# Patient Record
Sex: MALE | Race: BLACK OR AFRICAN AMERICAN
[De-identification: names, ages, dates, MRNs, and addresses within clinical notes are randomized per-mention and may not be internally consistent; named-entity substitution may affect disease eponyms.]

---

## 2019-05-09 PROBLEM — Z00.00 ENCOUNTER FOR PREVENTIVE HEALTH EXAMINATION: Status: ACTIVE | Noted: 2019-05-09

## 2019-05-16 ENCOUNTER — APPOINTMENT (OUTPATIENT)
Dept: PHYSICAL MEDICINE AND REHAB | Facility: CLINIC | Age: 59
End: 2019-05-16

## 2019-05-23 ENCOUNTER — APPOINTMENT (OUTPATIENT)
Dept: PHYSICAL MEDICINE AND REHAB | Facility: CLINIC | Age: 59
End: 2019-05-23
Payer: OTHER MISCELLANEOUS

## 2019-05-23 VITALS — HEIGHT: 74 IN | BODY MASS INDEX: 40.43 KG/M2 | WEIGHT: 315 LBS

## 2019-05-23 PROCEDURE — 99204 OFFICE O/P NEW MOD 45 MIN: CPT

## 2019-05-23 NOTE — PHYSICAL EXAM
[Antalgic Gait Right] : antalgic on the right [Normal Motor Tone] : the muscle tone was normal [Involuntary Movements] : no involuntary movements were seen [0] : left 0 [1+] : left 1+ [Normal] : Oriented to person, place, and time, insight and judgement were intact and the affect was normal [FreeTextEntry1] : No gross deformity, TTP of the lumbar paraspinals: bilaterally, ROM limited with all planes, SLR positive: left [de-identified] : No gross deformity, TTP of the lumbar paraspinals: bilaterally, ROM limited with all planes, SLR positive: left
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
## 2019-05-23 NOTE — ASSESSMENT
[FreeTextEntry1] : Patient had improvement in symptoms after 1st epidural for 6-7 months, the last two epidurals after which were done about 5 months ago provided no relief. I do not have an MRI of his L spine. I instructed him to get me the MRI so I can review it and find the appropriate management for him. He may need left L5/S1 ILESI.

## 2019-07-31 ENCOUNTER — APPOINTMENT (OUTPATIENT)
Dept: PHYSICAL MEDICINE AND REHAB | Facility: CLINIC | Age: 59
End: 2019-07-31

## 2019-08-07 ENCOUNTER — APPOINTMENT (OUTPATIENT)
Dept: PHYSICAL MEDICINE AND REHAB | Facility: CLINIC | Age: 59
End: 2019-08-07

## 2019-08-21 ENCOUNTER — APPOINTMENT (OUTPATIENT)
Dept: PHYSICAL MEDICINE AND REHAB | Facility: CLINIC | Age: 59
End: 2019-08-21
Payer: OTHER MISCELLANEOUS

## 2019-08-21 DIAGNOSIS — M47.817 SPONDYLOSIS W/OUT MYELOPATHY OR RADICULOPATHY, LUMBOSACRAL REGION: ICD-10-CM

## 2019-08-21 PROCEDURE — 99213 OFFICE O/P EST LOW 20 MIN: CPT

## 2019-08-21 NOTE — PHYSICAL EXAM
[Antalgic Gait Right] : antalgic on the right [Normal Motor Tone] : the muscle tone was normal [Involuntary Movements] : no involuntary movements were seen [0] : left 0 [1+] : right 1+ [Normal] : Oriented to person, place, and time, insight and judgement were intact and the affect was normal [de-identified] : No gross deformity, TTP of the lumbar paraspinals: bilaterally, ROM limited with all planes, SLR positive: left

## 2019-08-21 NOTE — ASSESSMENT
[FreeTextEntry1] : MRI reviewed, patient has two herniated discs at L4/5 and L5/S1. He is still having pain going his left leg and he has not been able to start PT as the  discontinued therapy over 6-7 months ago. He did PT for 8 months without significant improvement at that time. Based on his evaluation he may benefit from a left L5/S1 ILESI. If he continues to have low back pain after his leg pain is gone, he may benefit from RFA in the future.
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
## 2019-09-04 ENCOUNTER — APPOINTMENT (OUTPATIENT)
Dept: PHYSICAL MEDICINE AND REHAB | Facility: CLINIC | Age: 59
End: 2019-09-04

## 2019-09-05 ENCOUNTER — TRANSCRIPTION ENCOUNTER (OUTPATIENT)
Age: 59
End: 2019-09-05

## 2019-10-16 ENCOUNTER — APPOINTMENT (OUTPATIENT)
Dept: PHYSICAL MEDICINE AND REHAB | Facility: CLINIC | Age: 59
End: 2019-10-16

## 2019-12-03 ENCOUNTER — APPOINTMENT (OUTPATIENT)
Dept: PHYSICAL MEDICINE AND REHAB | Facility: CLINIC | Age: 59
End: 2019-12-03
Payer: OTHER MISCELLANEOUS

## 2019-12-03 PROCEDURE — 62323 NJX INTERLAMINAR LMBR/SAC: CPT | Mod: LT

## 2019-12-18 ENCOUNTER — APPOINTMENT (OUTPATIENT)
Dept: PHYSICAL MEDICINE AND REHAB | Facility: CLINIC | Age: 59
End: 2019-12-18

## 2020-05-20 ENCOUNTER — APPOINTMENT (OUTPATIENT)
Dept: PHYSICAL MEDICINE AND REHAB | Facility: CLINIC | Age: 60
End: 2020-05-20
Payer: OTHER MISCELLANEOUS

## 2020-05-20 PROCEDURE — 99214 OFFICE O/P EST MOD 30 MIN: CPT | Mod: 95

## 2020-05-20 RX ORDER — GABAPENTIN 300 MG/1
300 CAPSULE ORAL 3 TIMES DAILY
Qty: 90 | Refills: 2 | Status: ACTIVE | COMMUNITY
Start: 2020-05-20 | End: 1900-01-01

## 2020-05-21 NOTE — ASSESSMENT
[FreeTextEntry1] : We will begin with conservative management including physical therapy and oral Gabapentin. If patient has not improved after 2 weeks consider repeat FRAN. \par

## 2020-05-21 NOTE — PHYSICAL EXAM
[FreeTextEntry1] : General: NAD, sitting comfortably in good spirits\par Eyes: Normal sclera, EOMI\par Lymphatic: no obvious edema\par Cardiovascular: no cyanosis\par Skin: Fully clothed, no gross abnormalities\par Neurological/Psychiatric: Sensation intact,  Alert and Oriented to person, place and time\par MSK:\par Gait: Ambulating with no assistive device\par LUE: no gross abnormalities. Normal ROM.  At least antigravity\par RUE: no gross abnormalities. Normal ROM.  At least antigravity\par LLE: no gross abnormalities. Normal ROM.  At least antigravity\par RLE: no gross abnormalities. Normal ROM.  At least antigravity\par Lumbar Spine: No gross deformity, TTP of the lumbar paraspinals: bilaterally (reported by patient), ROM limited with all planes, SLR positive: left\par

## 2020-05-21 NOTE — HISTORY OF PRESENT ILLNESS
[Home] : at home, [unfilled] , at the time of the visit. [Other Location: e.g. Home (Enter Location, City,State)___] : at [unfilled] [Verbal consent obtained from patient] : the patient, [unfilled] [FreeTextEntry1] : Patient is following up on low back pain. He had left L5/S1 ILESI done on 12/3/2019 with greater than 50% improvement in pain. He was doing well overall until about 2-3 weeks ago when he began having the pain again. Pain is moderate at this time. He wants to know if another epidural will help.

## 2020-11-11 ENCOUNTER — APPOINTMENT (OUTPATIENT)
Dept: PHYSICAL MEDICINE AND REHAB | Facility: CLINIC | Age: 60
End: 2020-11-11

## 2020-11-18 ENCOUNTER — APPOINTMENT (OUTPATIENT)
Dept: PHYSICAL MEDICINE AND REHAB | Facility: CLINIC | Age: 60
End: 2020-11-18

## 2021-03-12 ENCOUNTER — APPOINTMENT (OUTPATIENT)
Dept: PHYSICAL MEDICINE AND REHAB | Facility: CLINIC | Age: 61
End: 2021-03-12
Payer: OTHER MISCELLANEOUS

## 2021-03-16 ENCOUNTER — APPOINTMENT (OUTPATIENT)
Dept: PHYSICAL MEDICINE AND REHAB | Facility: CLINIC | Age: 61
End: 2021-03-16
Payer: OTHER MISCELLANEOUS

## 2021-03-16 VITALS
SYSTOLIC BLOOD PRESSURE: 161 MMHG | OXYGEN SATURATION: 96 % | DIASTOLIC BLOOD PRESSURE: 94 MMHG | TEMPERATURE: 97.8 F | HEART RATE: 86 BPM

## 2021-03-16 DIAGNOSIS — M54.16 RADICULOPATHY, LUMBAR REGION: ICD-10-CM

## 2021-03-16 PROCEDURE — 99072 ADDL SUPL MATRL&STAF TM PHE: CPT

## 2021-03-16 PROCEDURE — 99215 OFFICE O/P EST HI 40 MIN: CPT

## 2021-03-16 NOTE — PHYSICAL EXAM
[FreeTextEntry1] : PE:\par Constitutional: In NAD, calm and cooperative\par HEENT: NCAT, Anicteric sclera, no lid-lag\par Cardio: Extremities appear pink and well perfused, no peripheral edema\par Respiratory: Normal respiratory effort on room air, no accessory muscle use\par Skin: no rashes seen on exposed skin, no visible abrasions\par Psych: Normal affect, intact judgment and insight\par Neuro: Awake, alert and oriented x 3, see below for focused neurological exam\par MSK (Back)\par 	Inspection: no gross swelling identified\par 	Palpation: Tenderness of the bilateral lower lumbar paraspinals, piriformis, PSIS, and greater trochanters worse on the right\par 	ROM: Pain at end lumbar extension/flexion - unable to identify if one is worse than the other\par 	Strength: 5/5 strength in bilateral lower extremities\par 	Sensation: Intact to light touch in bilateral lower extremities\par Special tests:\par SLR: positive bilateral\par VALERY: negative\par

## 2021-03-16 NOTE — END OF VISIT
[FreeTextEntry3] : I have personally seen, examined, and participated in the care of this patient. I was physically present for key portions of the evaluation and management service provided and I have reviewed all pertinent clinical information.  I agree with the resident's history, physical exam, and plan which I reviewed and edited where appropriate.
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
## 2021-03-21 ENCOUNTER — FORM ENCOUNTER (OUTPATIENT)
Age: 61
End: 2021-03-21

## 2021-04-09 ENCOUNTER — APPOINTMENT (OUTPATIENT)
Dept: MRI IMAGING | Facility: CLINIC | Age: 61
End: 2021-04-09
Payer: OTHER MISCELLANEOUS

## 2021-04-09 ENCOUNTER — OUTPATIENT (OUTPATIENT)
Dept: OUTPATIENT SERVICES | Facility: HOSPITAL | Age: 61
LOS: 1 days | End: 2021-04-09

## 2021-04-09 PROCEDURE — 72148 MRI LUMBAR SPINE W/O DYE: CPT | Mod: 26

## 2021-04-20 ENCOUNTER — APPOINTMENT (OUTPATIENT)
Dept: PHYSICAL MEDICINE AND REHAB | Facility: CLINIC | Age: 61
End: 2021-04-20
Payer: OTHER MISCELLANEOUS

## 2021-04-20 VITALS
TEMPERATURE: 97.9 F | WEIGHT: 315 LBS | BODY MASS INDEX: 40.43 KG/M2 | SYSTOLIC BLOOD PRESSURE: 148 MMHG | HEART RATE: 91 BPM | DIASTOLIC BLOOD PRESSURE: 78 MMHG | OXYGEN SATURATION: 95 % | HEIGHT: 74 IN

## 2021-04-20 DIAGNOSIS — M48.061 SPINAL STENOSIS, LUMBAR REGION WITHOUT NEUROGENIC CLAUDICATION: ICD-10-CM

## 2021-04-20 DIAGNOSIS — M47.816 SPONDYLOSIS W/OUT MYELOPATHY OR RADICULOPATHY, LUMBAR REGION: ICD-10-CM

## 2021-04-20 PROCEDURE — 99072 ADDL SUPL MATRL&STAF TM PHE: CPT

## 2021-04-20 PROCEDURE — 99214 OFFICE O/P EST MOD 30 MIN: CPT

## 2021-04-20 RX ORDER — DULAGLUTIDE 0.75 MG/.5ML
0.75 INJECTION, SOLUTION SUBCUTANEOUS
Qty: 2 | Refills: 0 | Status: ACTIVE | COMMUNITY
Start: 2021-04-12

## 2021-04-20 RX ORDER — METFORMIN HYDROCHLORIDE 1000 MG/1
1000 TABLET, COATED ORAL
Qty: 60 | Refills: 0 | Status: ACTIVE | COMMUNITY
Start: 2021-04-08

## 2021-04-20 RX ORDER — FERROUS SULFATE TAB EC 324 MG (65 MG FE EQUIVALENT) 324 (65 FE) MG
324 (65 FE) TABLET DELAYED RESPONSE ORAL
Qty: 30 | Refills: 0 | Status: ACTIVE | COMMUNITY
Start: 2021-04-08

## 2021-04-20 RX ORDER — ERGOCALCIFEROL 1.25 MG/1
1.25 MG CAPSULE, LIQUID FILLED ORAL
Qty: 4 | Refills: 0 | Status: ACTIVE | COMMUNITY
Start: 2021-04-08

## 2021-04-20 RX ORDER — ATORVASTATIN CALCIUM 40 MG/1
40 TABLET, FILM COATED ORAL
Qty: 30 | Refills: 0 | Status: ACTIVE | COMMUNITY
Start: 2021-04-08

## 2021-04-20 RX ORDER — DOCUSATE SODIUM 100 MG/1
100 CAPSULE ORAL
Qty: 30 | Refills: 0 | Status: ACTIVE | COMMUNITY
Start: 2021-04-08

## 2021-04-20 RX ORDER — CANDESARTAN CILEXETIL 16 MG/1
16 TABLET ORAL
Qty: 90 | Refills: 0 | Status: ACTIVE | COMMUNITY
Start: 2020-06-12

## 2021-04-20 RX ORDER — PEN NEEDLE, DIABETIC 29 G X1/2"
31G X 5 MM NEEDLE, DISPOSABLE MISCELLANEOUS
Qty: 100 | Refills: 0 | Status: ACTIVE | COMMUNITY
Start: 2021-04-12
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
## 2021-04-20 NOTE — DATA REVIEWED
[FreeTextEntry1] : \par EXAM: MR SPINE LUMBAR\par \par PROCEDURE DATE: 04/09/2021\par \par \par \par \par INTERPRETATION: PROCEDURE: MRI of the lumbosacral spine without contrast\par \par INDICATION: Low back and bilateral leg pain\par \par TECHNIQUE: Sagittal T1, T2, and STIR and axial T1 and T2 weighted images of the lumbosacral spine were obtained. Some of the images are degraded by motion artifact.\par \par COMPARISON: None\par \par FINDINGS: The MRI exam demonstrates the lumbosacral alignment to be intact. There is mild loss of intervertebral disc space height with disc desiccation at L4/5. The rest of the vertebral body heights and intervertebral disc spaces are maintained. There are well-circumscribed foci of hyperintense T1 and T2 signal within the L2, L4 and L5 vertebral bodies compatible with hemangiomas. The rest of the vertebral body marrow signal is appropriate for the patient's stated age. The conus medullaris ends at T12/L1. There is prominent epidural fat.\par \par At the L1/2 level, there is no disc herniation. There is no spinal canal stenosis or neural foramen narrowing.\par \par At the L2/3 level, there is no disc herniation. There is no spinal canal stenosis or neural foramen narrowing.\par \par At the L3/4 level, there is no disc herniation. There is increased fluid within the facet joints bilaterally. There is no spinal canal stenosis or neural foramen narrowing.\par \par At the L4/5 level, there is disc bulge which abuts the L4 nerve roots bilaterally (left greater than right) and correlation with the patient's clinical history is recommended. There are degenerative changes involving the facets bilaterally. This combination results in mild central spinal canal stenosis. There is mild bilateral neural foramen narrowing.\par \par At the L5/S1 level, there is no disc herniation. There are degenerative changes involving the right facet with increased fluid within the facet joints bilaterally. There is no spinal canal stenosis. There is mild right neural foramen narrowing.\par \par IMPRESSION: Degenerative disc disease with bulge at L4/5 contacting the L4 nerve roots bilaterally with mild central spinal canal stenosis.\par \par \par \par \par \par \par TY HICKS MD; Attending Radiologist\par This document has been electronically signed. Apr 9 2021 8:49AM

## 2021-04-20 NOTE — ASSESSMENT
[FreeTextEntry1] : Mr. BETY MANE is a 60 year old male who presents with bilateral LBP with radiation into both of his legs, consistent with a lumbar radiculopathy. MRI shows bilateral L4 nerve impingement. Patient has received good results with FRAN in past. Denies any red flag signs. Will recommend:\par - Discussed with patient the risks (including but not limited to bleeding, infection, nerve damage, etc), benefits and alternatives to an epidural steroid injection for which patient understands. Will schedule for bilateral L4-5 TFESI. \par \par \par Patient aware of red flag signs including any changes to their bowel/bladder control, groin numbness or new weakness. Patient knows to seek immediate attention by calling 911 or going to nearest ER if these symptoms appear.

## 2021-04-20 NOTE — PHYSICAL EXAM
[FreeTextEntry1] : PE:\par Constitutional: In NAD, calm and cooperative\par HEENT: NCAT, Anicteric sclera, no lid-lag\par Cardio: Extremities appear pink and well perfused, no peripheral edema\par Respiratory: Normal respiratory effort on room air, no accessory muscle use\par Skin: no rashes seen on exposed skin, no visible abrasions\par Psych: Normal affect, intact judgment and insight\par Neuro: Awake, alert and oriented x 3, see below for focused neurological exam\par MSK (Back)\par 	Inspection: no gross swelling identified\par 	Palpation: Tenderness of the bilateral lower lumbar paraspinals\par 	ROM: Pain at end lumbar extension/flexion - unable to identify if one is worse than the other\par 	Strength: 5/5 strength in bilateral lower extremities\par 	Sensation: Intact to light touch in bilateral lower extremities\par Special tests:\par SLR: positive bilateral\par VALERY: negative\par

## 2022-02-25 ENCOUNTER — APPOINTMENT (OUTPATIENT)
Dept: PHYSICAL MEDICINE AND REHAB | Facility: CLINIC | Age: 62
End: 2022-02-25

## 2022-03-24 ENCOUNTER — FORM ENCOUNTER (OUTPATIENT)
Age: 62
End: 2022-03-24